# Patient Record
Sex: FEMALE | Race: WHITE | Employment: FULL TIME | URBAN - NONMETROPOLITAN AREA
[De-identification: names, ages, dates, MRNs, and addresses within clinical notes are randomized per-mention and may not be internally consistent; named-entity substitution may affect disease eponyms.]

---

## 2023-02-16 LAB
ALBUMIN SERPL-MCNC: 4.8 G/DL
ALP BLD-CCNC: 81 U/L
ALT SERPL-CCNC: 35 U/L
ANION GAP SERPL CALCULATED.3IONS-SCNC: 12 MMOL/L
AST SERPL-CCNC: 30 U/L
BASOPHILS ABSOLUTE: NORMAL
BASOPHILS RELATIVE PERCENT: NORMAL
BILIRUB SERPL-MCNC: 0.4 MG/DL (ref 0.1–1.4)
BILIRUBIN, URINE: NEGATIVE
BLOOD, URINE: NEGATIVE
BUN BLDV-MCNC: 15 MG/DL
CALCIUM SERPL-MCNC: 8.9 MG/DL
CHLORIDE BLD-SCNC: 103 MMOL/L
CLARITY: CLEAR
CO2: 28 MMOL/L
COLOR: YELLOW
CREAT SERPL-MCNC: 0.6 MG/DL
EGFR: 122
EOSINOPHILS ABSOLUTE: NORMAL
EOSINOPHILS RELATIVE PERCENT: NORMAL
GLUCOSE BLD-MCNC: 75 MG/DL
GLUCOSE URINE: NEGATIVE
HCT VFR BLD CALC: 42 % (ref 36–46)
HEMOGLOBIN: 13.9 G/DL (ref 12–16)
KETONES, URINE: NEGATIVE
LEUKOCYTE ESTERASE, URINE: NEGATIVE
LYMPHOCYTES ABSOLUTE: NORMAL
LYMPHOCYTES RELATIVE PERCENT: NORMAL
MCH RBC QN AUTO: NORMAL PG
MCHC RBC AUTO-ENTMCNC: NORMAL G/DL
MCV RBC AUTO: NORMAL FL
MONOCYTES ABSOLUTE: NORMAL
MONOCYTES RELATIVE PERCENT: NORMAL
NEUTROPHILS ABSOLUTE: NORMAL
NEUTROPHILS RELATIVE PERCENT: NORMAL
NITRITE, URINE: NEGATIVE
PDW BLD-RTO: NORMAL %
PH UA: 7.5 (ref 4.5–8)
PHOSPHORUS: 81 MG/DL
PLATELET # BLD: 192 K/ΜL
PMV BLD AUTO: NORMAL FL
POTASSIUM SERPL-SCNC: 4.5 MMOL/L
PROTEIN UA: NEGATIVE
RBC # BLD: 4.75 10^6/ΜL
SODIUM BLD-SCNC: 139 MMOL/L
SPECIFIC GRAVITY, URINE: 1.01
TOTAL PROTEIN: 7.6
UROBILINOGEN, URINE: NORMAL
WBC # BLD: 6.47 10^3/ML

## 2023-05-16 PROBLEM — F43.9 STRESS: Status: ACTIVE | Noted: 2023-05-16

## 2023-05-16 PROBLEM — O26.619 RECURRENT JAUNDICE OF PREGNANCY: Status: ACTIVE | Noted: 2023-05-16

## 2023-05-16 PROBLEM — L29.9 PRURITUS OF PREGNANCY: Status: ACTIVE | Noted: 2023-05-16

## 2023-05-16 PROBLEM — O28.9 ABNORMAL FINDING ON ANTENATAL SCREENING OF MOTHER: Status: ACTIVE | Noted: 2023-05-16

## 2023-05-16 PROBLEM — N92.6 MISSED PERIOD: Status: ACTIVE | Noted: 2023-05-16

## 2023-05-16 PROBLEM — R20.2 PARESTHESIA: Status: ACTIVE | Noted: 2023-05-16

## 2023-05-16 PROBLEM — K04.7 INFECTION OF TOOTH: Status: ACTIVE | Noted: 2023-05-16

## 2023-05-16 PROBLEM — O23.40 URINARY TRACT INFECTION IN MOTHER DURING PREGNANCY: Status: ACTIVE | Noted: 2023-05-16

## 2023-05-16 PROBLEM — O99.719 PRURITUS OF PREGNANCY: Status: ACTIVE | Noted: 2023-05-16

## 2023-05-16 PROBLEM — O20.0 THREATENED ABORTION IN FIRST TRIMESTER: Status: ACTIVE | Noted: 2023-05-16

## 2023-05-16 PROBLEM — K80.20 CHOLELITHIASIS: Status: ACTIVE | Noted: 2023-05-16

## 2023-05-16 PROBLEM — N20.0 CALCULUS OF KIDNEY: Status: ACTIVE | Noted: 2023-02-16

## 2023-05-16 PROBLEM — R17 RECURRENT JAUNDICE OF PREGNANCY: Status: ACTIVE | Noted: 2023-05-16

## 2023-05-23 ENCOUNTER — OFFICE VISIT (OUTPATIENT)
Dept: NEPHROLOGY | Age: 33
End: 2023-05-23
Payer: COMMERCIAL

## 2023-05-23 VITALS
BODY MASS INDEX: 22.66 KG/M2 | HEIGHT: 61 IN | HEART RATE: 84 BPM | WEIGHT: 120 LBS | OXYGEN SATURATION: 98 % | DIASTOLIC BLOOD PRESSURE: 60 MMHG | SYSTOLIC BLOOD PRESSURE: 114 MMHG

## 2023-05-23 DIAGNOSIS — N20.0 KIDNEY STONES: Primary | ICD-10-CM

## 2023-05-23 PROCEDURE — 99204 OFFICE O/P NEW MOD 45 MIN: CPT | Performed by: INTERNAL MEDICINE

## 2023-10-03 ENCOUNTER — TELEPHONE (OUTPATIENT)
Dept: NEPHROLOGY | Age: 33
End: 2023-10-03

## 2023-10-26 ENCOUNTER — TELEPHONE (OUTPATIENT)
Dept: NEPHROLOGY | Age: 33
End: 2023-10-26

## 2023-10-26 DIAGNOSIS — N20.0 KIDNEY STONES: ICD-10-CM

## 2023-10-26 NOTE — TELEPHONE ENCOUNTER
Chris Napoles from [facility], [place] called to inform us that [place] had a kidney/bladder [address] on October 23,2023. She is scheduled for a renal US complete ordered by Dr. Zoey Harry tomorrow. She is asking if Dr. Zoye Harry still wants that done. She will fax over the [address] report from 10/23/[address] for [address] Zoey Harry to review and we will let them know. Also called pt to confirm her appointment for next Thursday and to remind her to have her labs done. She states she is currently on Amoxicillin for a kidney infection. Her OBGYN is the Doctor who ordered the US due to abdominal and urinary pain.